# Patient Record
Sex: FEMALE | Race: WHITE | Employment: OTHER | URBAN - METROPOLITAN AREA
[De-identification: names, ages, dates, MRNs, and addresses within clinical notes are randomized per-mention and may not be internally consistent; named-entity substitution may affect disease eponyms.]

---

## 2019-04-16 PROBLEM — H35.30 MACULAR DEGENERATION, UNSPECIFIED LATERALITY, UNSPECIFIED TYPE: Status: ACTIVE | Noted: 2019-04-16

## 2019-04-16 PROBLEM — F02.80 ALZHEIMER'S DEMENTIA WITHOUT BEHAVIORAL DISTURBANCE, UNSPECIFIED TIMING OF DEMENTIA ONSET (HCC): Status: ACTIVE | Noted: 2019-04-16

## 2019-04-16 PROBLEM — M81.0 AGE-RELATED OSTEOPOROSIS WITHOUT CURRENT PATHOLOGICAL FRACTURE: Status: ACTIVE | Noted: 2019-04-16

## 2019-04-16 PROBLEM — I70.0 AORTIC ATHEROSCLEROSIS (HCC): Status: ACTIVE | Noted: 2019-04-16

## 2019-04-16 PROBLEM — N18.30 CKD (CHRONIC KIDNEY DISEASE) STAGE 3, GFR 30-59 ML/MIN (HCC): Status: ACTIVE | Noted: 2019-04-16

## 2019-04-16 PROBLEM — G30.9 ALZHEIMER'S DEMENTIA WITHOUT BEHAVIORAL DISTURBANCE, UNSPECIFIED TIMING OF DEMENTIA ONSET (HCC): Status: ACTIVE | Noted: 2019-04-16

## 2019-04-16 PROBLEM — E78.5 HYPERLIPIDEMIA, UNSPECIFIED HYPERLIPIDEMIA TYPE: Status: ACTIVE | Noted: 2019-04-16

## 2019-10-30 ENCOUNTER — HOSPITAL ENCOUNTER (OUTPATIENT)
Dept: BONE DENSITY | Facility: HOSPITAL | Age: 84
Discharge: HOME | End: 2019-10-30
Attending: INTERNAL MEDICINE
Payer: MEDICARE

## 2019-10-30 DIAGNOSIS — M81.0 OSTEOPOROSIS, UNSPECIFIED OSTEOPOROSIS TYPE, UNSPECIFIED PATHOLOGICAL FRACTURE PRESENCE: ICD-10-CM

## 2019-10-30 PROCEDURE — 77080 DXA BONE DENSITY AXIAL: CPT | Performed by: INTERNAL MEDICINE

## 2020-05-16 ENCOUNTER — HOSPITAL ENCOUNTER (OUTPATIENT)
Dept: ULTRASOUND IMAGING | Facility: HOSPITAL | Age: 85
Discharge: HOME | End: 2020-05-16
Attending: FAMILY MEDICINE
Payer: MEDICARE

## 2020-05-16 DIAGNOSIS — M79.89 LEG SWELLING: ICD-10-CM

## 2020-05-16 PROCEDURE — 93971 EXTREMITY STUDY: CPT | Performed by: FAMILY MEDICINE

## 2020-08-18 PROBLEM — M81.0 SENILE OSTEOPOROSIS: Status: ACTIVE | Noted: 2019-04-16

## 2020-08-18 PROBLEM — F32.A DEPRESSION, UNSPECIFIED DEPRESSION TYPE: Status: ACTIVE | Noted: 2020-08-18

## 2021-01-01 ENCOUNTER — HOSPITAL ENCOUNTER (OUTPATIENT)
Facility: HOSPITAL | Age: 86
Setting detail: OBSERVATION
Discharge: HOME HEALTH | End: 2021-01-01
Attending: EMERGENCY MEDICINE | Admitting: HOSPITALIST
Payer: MEDICARE

## 2021-01-01 ENCOUNTER — HOSPITAL ENCOUNTER (EMERGENCY)
Facility: HOSPITAL | Age: 86
Discharge: HOME | End: 2021-01-01
Attending: EMERGENCY MEDICINE
Payer: MEDICARE

## 2021-01-01 ENCOUNTER — APPOINTMENT (OUTPATIENT)
Dept: GENERAL RADIOLOGY | Facility: HOSPITAL | Age: 86
End: 2021-01-01
Attending: EMERGENCY MEDICINE
Payer: MEDICARE

## 2021-01-01 ENCOUNTER — APPOINTMENT (OUTPATIENT)
Dept: CT IMAGING | Facility: HOSPITAL | Age: 86
DRG: 087 | End: 2021-01-01
Attending: EMERGENCY MEDICINE
Payer: MEDICARE

## 2021-01-01 ENCOUNTER — APPOINTMENT (OUTPATIENT)
Dept: GENERAL RADIOLOGY | Facility: HOSPITAL | Age: 86
End: 2021-01-01
Attending: INTERNAL MEDICINE
Payer: MEDICARE

## 2021-01-01 ENCOUNTER — HOSPITAL ENCOUNTER (INPATIENT)
Facility: HOSPITAL | Age: 86
LOS: 4 days | Discharge: HOME | DRG: 087 | End: 2021-01-01
Attending: EMERGENCY MEDICINE | Admitting: INTERNAL MEDICINE
Payer: MEDICARE

## 2021-01-01 ENCOUNTER — APPOINTMENT (OUTPATIENT)
Dept: CT IMAGING | Facility: HOSPITAL | Age: 86
DRG: 087 | End: 2021-01-01
Attending: INTERNAL MEDICINE
Payer: MEDICARE

## 2021-01-01 VITALS
HEIGHT: 62.01 IN | DIASTOLIC BLOOD PRESSURE: 74 MMHG | BODY MASS INDEX: 17.47 KG/M2 | RESPIRATION RATE: 16 BRPM | SYSTOLIC BLOOD PRESSURE: 116 MMHG | OXYGEN SATURATION: 94 % | TEMPERATURE: 98 F | WEIGHT: 96.13 LBS | HEART RATE: 82 BPM

## 2021-01-01 VITALS
HEIGHT: 62 IN | OXYGEN SATURATION: 94 % | RESPIRATION RATE: 18 BRPM | TEMPERATURE: 97 F | BODY MASS INDEX: 18.03 KG/M2 | HEART RATE: 76 BPM | SYSTOLIC BLOOD PRESSURE: 157 MMHG | DIASTOLIC BLOOD PRESSURE: 89 MMHG | WEIGHT: 98 LBS

## 2021-01-01 VITALS
BODY MASS INDEX: 17.66 KG/M2 | SYSTOLIC BLOOD PRESSURE: 120 MMHG | RESPIRATION RATE: 18 BRPM | TEMPERATURE: 98 F | DIASTOLIC BLOOD PRESSURE: 66 MMHG | WEIGHT: 96 LBS | HEART RATE: 102 BPM | HEIGHT: 62 IN | OXYGEN SATURATION: 92 %

## 2021-01-01 DIAGNOSIS — S06.341A TRAUMATIC HEMORRHAGE OF RIGHT CEREBRUM WITH LOSS OF CONSCIOUSNESS OF 30 MINUTES OR LESS, INITIAL ENCOUNTER (HCC): Primary | ICD-10-CM

## 2021-01-01 DIAGNOSIS — S00.81XA ABRASION OF FACE, INITIAL ENCOUNTER: ICD-10-CM

## 2021-01-01 DIAGNOSIS — R53.1 WEAKNESS GENERALIZED: Primary | ICD-10-CM

## 2021-01-01 DIAGNOSIS — I62.03 CHRONIC SUBDURAL HEMATOMA (HCC): ICD-10-CM
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
## 2021-01-01 LAB
ALBUMIN SERPL-MCNC: 2.3 G/DL (ref 3.4–5)
ALBUMIN/GLOB SERPL: 0.6 {RATIO} (ref 1–2)
ALP LIVER SERPL-CCNC: 66 U/L
ALT SERPL-CCNC: 16 U/L
ANION GAP SERPL CALC-SCNC: 4 MMOL/L (ref 0–18)
ANION GAP SERPL CALC-SCNC: 5 MMOL/L (ref 0–18)
ANION GAP SERPL CALC-SCNC: 7 MMOL/L (ref 0–18)
ANION GAP SERPL CALC-SCNC: 8 MMOL/L (ref 0–18)
ANION GAP SERPL CALC-SCNC: 9 MMOL/L (ref 0–18)
ANTIBODY SCREEN: NEGATIVE
APTT PPP: 25.4 SECONDS (ref 23.2–35.3)
AST SERPL-CCNC: 23 U/L (ref 15–37)
BASOPHILS # BLD AUTO: 0.07 X10(3) UL (ref 0–0.2)
BASOPHILS # BLD AUTO: 0.08 X10(3) UL (ref 0–0.2)
BASOPHILS # BLD AUTO: 0.09 X10(3) UL (ref 0–0.2)
BASOPHILS # BLD AUTO: 0.1 X10(3) UL (ref 0–0.2)
BASOPHILS # BLD AUTO: 0.14 X10(3) UL (ref 0–0.2)
BASOPHILS NFR BLD AUTO: 0.5 %
BASOPHILS NFR BLD AUTO: 0.5 %
BASOPHILS NFR BLD AUTO: 0.9 %
BASOPHILS NFR BLD AUTO: 1.1 %
BASOPHILS NFR BLD AUTO: 1.1 %
BILIRUB SERPL-MCNC: 0.5 MG/DL (ref 0.1–2)
BILIRUB UR QL: NEGATIVE
BUN BLD-MCNC: 10 MG/DL (ref 7–18)
BUN BLD-MCNC: 11 MG/DL (ref 7–18)
BUN BLD-MCNC: 21 MG/DL (ref 7–18)
BUN/CREAT SERPL: 13.5 (ref 10–20)
BUN/CREAT SERPL: 14.1 (ref 10–20)
BUN/CREAT SERPL: 15.1 (ref 10–20)
BUN/CREAT SERPL: 15.9 (ref 10–20)
BUN/CREAT SERPL: 22.1 (ref 10–20)
CALCIUM BLD-MCNC: 10.2 MG/DL (ref 8.5–10.1)
CALCIUM BLD-MCNC: 8.5 MG/DL (ref 8.5–10.1)
CALCIUM BLD-MCNC: 8.8 MG/DL (ref 8.5–10.1)
CALCIUM BLD-MCNC: 9.1 MG/DL (ref 8.5–10.1)
CALCIUM BLD-MCNC: 9.4 MG/DL (ref 8.5–10.1)
CHLORIDE SERPL-SCNC: 107 MMOL/L (ref 98–112)
CHLORIDE SERPL-SCNC: 107 MMOL/L (ref 98–112)
CHLORIDE SERPL-SCNC: 111 MMOL/L (ref 98–112)
CHLORIDE SERPL-SCNC: 113 MMOL/L (ref 98–112)
CHLORIDE SERPL-SCNC: 115 MMOL/L (ref 98–112)
CHOLEST SMN-MCNC: 200 MG/DL (ref ?–200)
CO2 SERPL-SCNC: 22 MMOL/L (ref 21–32)
CO2 SERPL-SCNC: 26 MMOL/L (ref 21–32)
CO2 SERPL-SCNC: 26 MMOL/L (ref 21–32)
CO2 SERPL-SCNC: 27 MMOL/L (ref 21–32)
CO2 SERPL-SCNC: 27 MMOL/L (ref 21–32)
COLOR UR: YELLOW
CREAT BLD-MCNC: 0.69 MG/DL
CREAT BLD-MCNC: 0.73 MG/DL
CREAT BLD-MCNC: 0.74 MG/DL
CREAT BLD-MCNC: 0.78 MG/DL
CREAT BLD-MCNC: 0.95 MG/DL
DEPRECATED RDW RBC AUTO: 47.5 FL (ref 35.1–46.3)
DEPRECATED RDW RBC AUTO: 47.5 FL (ref 35.1–46.3)
DEPRECATED RDW RBC AUTO: 48.6 FL (ref 35.1–46.3)
DEPRECATED RDW RBC AUTO: 48.9 FL (ref 35.1–46.3)
DEPRECATED RDW RBC AUTO: 49.6 FL (ref 35.1–46.3)
EOSINOPHIL # BLD AUTO: 0.03 X10(3) UL (ref 0–0.7)
EOSINOPHIL # BLD AUTO: 0.07 X10(3) UL (ref 0–0.7)
EOSINOPHIL # BLD AUTO: 0.19 X10(3) UL (ref 0–0.7)
EOSINOPHIL # BLD AUTO: 0.27 X10(3) UL (ref 0–0.7)
EOSINOPHIL # BLD AUTO: 0.57 X10(3) UL (ref 0–0.7)
EOSINOPHIL NFR BLD AUTO: 0.2 %
EOSINOPHIL NFR BLD AUTO: 0.5 %
EOSINOPHIL NFR BLD AUTO: 1.5 %
EOSINOPHIL NFR BLD AUTO: 2.8 %
EOSINOPHIL NFR BLD AUTO: 6.4 %
ERYTHROCYTE [DISTWIDTH] IN BLOOD BY AUTOMATED COUNT: 14.6 % (ref 11–15)
ERYTHROCYTE [DISTWIDTH] IN BLOOD BY AUTOMATED COUNT: 14.7 % (ref 11–15)
ERYTHROCYTE [DISTWIDTH] IN BLOOD BY AUTOMATED COUNT: 14.7 % (ref 11–15)
ERYTHROCYTE [DISTWIDTH] IN BLOOD BY AUTOMATED COUNT: 15 % (ref 11–15)
ERYTHROCYTE [DISTWIDTH] IN BLOOD BY AUTOMATED COUNT: 15.3 % (ref 11–15)
GLOBULIN PLAS-MCNC: 3.9 G/DL (ref 2.8–4.4)
GLUCOSE BLD-MCNC: 112 MG/DL (ref 70–99)
GLUCOSE BLD-MCNC: 115 MG/DL (ref 70–99)
GLUCOSE BLD-MCNC: 139 MG/DL (ref 70–99)
GLUCOSE BLD-MCNC: 95 MG/DL (ref 70–99)
GLUCOSE BLD-MCNC: 95 MG/DL (ref 70–99)
GLUCOSE BLDC GLUCOMTR-MCNC: 100 MG/DL (ref 70–99)
GLUCOSE BLDC GLUCOMTR-MCNC: 101 MG/DL (ref 70–99)
GLUCOSE BLDC GLUCOMTR-MCNC: 101 MG/DL (ref 70–99)
GLUCOSE BLDC GLUCOMTR-MCNC: 106 MG/DL (ref 70–99)
GLUCOSE BLDC GLUCOMTR-MCNC: 109 MG/DL (ref 70–99)
GLUCOSE BLDC GLUCOMTR-MCNC: 113 MG/DL (ref 70–99)
GLUCOSE BLDC GLUCOMTR-MCNC: 116 MG/DL (ref 70–99)
GLUCOSE BLDC GLUCOMTR-MCNC: 141 MG/DL (ref 70–99)
GLUCOSE BLDC GLUCOMTR-MCNC: 82 MG/DL (ref 70–99)
GLUCOSE BLDC GLUCOMTR-MCNC: 86 MG/DL (ref 70–99)
GLUCOSE BLDC GLUCOMTR-MCNC: 89 MG/DL (ref 70–99)
GLUCOSE BLDC GLUCOMTR-MCNC: 91 MG/DL (ref 70–99)
GLUCOSE BLDC GLUCOMTR-MCNC: 94 MG/DL (ref 70–99)
GLUCOSE BLDC GLUCOMTR-MCNC: 96 MG/DL (ref 70–99)
GLUCOSE BLDC GLUCOMTR-MCNC: 96 MG/DL (ref 70–99)
GLUCOSE BLDC GLUCOMTR-MCNC: 97 MG/DL (ref 70–99)
GLUCOSE UR-MCNC: NEGATIVE MG/DL
HCT VFR BLD AUTO: 38 %
HCT VFR BLD AUTO: 38.5 %
HCT VFR BLD AUTO: 42.5 %
HCT VFR BLD AUTO: 42.6 %
HCT VFR BLD AUTO: 42.8 %
HDLC SERPL-MCNC: 64 MG/DL (ref 40–59)
HGB BLD-MCNC: 12.3 G/DL
HGB BLD-MCNC: 12.8 G/DL
HGB BLD-MCNC: 13.6 G/DL
HGB BLD-MCNC: 13.7 G/DL
HGB BLD-MCNC: 13.9 G/DL
HGB UR QL STRIP.AUTO: NEGATIVE
IMM GRANULOCYTES # BLD AUTO: 0.03 X10(3) UL (ref 0–1)
IMM GRANULOCYTES # BLD AUTO: 0.03 X10(3) UL (ref 0–1)
IMM GRANULOCYTES # BLD AUTO: 0.05 X10(3) UL (ref 0–1)
IMM GRANULOCYTES # BLD AUTO: 0.05 X10(3) UL (ref 0–1)
IMM GRANULOCYTES # BLD AUTO: 0.07 X10(3) UL (ref 0–1)
IMM GRANULOCYTES NFR BLD: 0.3 %
IMM GRANULOCYTES NFR BLD: 0.4 %
IMM GRANULOCYTES NFR BLD: 0.6 %
INR BLD: 1.05 (ref 0.9–1.2)
KETONES UR-MCNC: NEGATIVE MG/DL
LDLC SERPL CALC-MCNC: 122 MG/DL (ref ?–100)
LEUKOCYTE ESTERASE UR QL STRIP.AUTO: NEGATIVE
LYMPHOCYTES # BLD AUTO: 1.36 X10(3) UL (ref 1–4)
LYMPHOCYTES # BLD AUTO: 1.53 X10(3) UL (ref 1–4)
LYMPHOCYTES # BLD AUTO: 1.72 X10(3) UL (ref 1–4)
LYMPHOCYTES # BLD AUTO: 1.88 X10(3) UL (ref 1–4)
LYMPHOCYTES # BLD AUTO: 2.31 X10(3) UL (ref 1–4)
LYMPHOCYTES NFR BLD AUTO: 10 %
LYMPHOCYTES NFR BLD AUTO: 13.5 %
LYMPHOCYTES NFR BLD AUTO: 14.2 %
LYMPHOCYTES NFR BLD AUTO: 18.7 %
LYMPHOCYTES NFR BLD AUTO: 21.2 %
M PROTEIN MFR SERPL ELPH: 6.2 G/DL (ref 6.4–8.2)
MCH RBC QN AUTO: 28.2 PG (ref 26–34)
MCH RBC QN AUTO: 28.5 PG (ref 26–34)
MCH RBC QN AUTO: 28.5 PG (ref 26–34)
MCH RBC QN AUTO: 29 PG (ref 26–34)
MCH RBC QN AUTO: 29.4 PG (ref 26–34)
MCHC RBC AUTO-ENTMCNC: 31.9 G/DL (ref 31–37)
MCHC RBC AUTO-ENTMCNC: 31.9 G/DL (ref 31–37)
MCHC RBC AUTO-ENTMCNC: 32.2 G/DL (ref 31–37)
MCHC RBC AUTO-ENTMCNC: 32.5 G/DL (ref 31–37)
MCHC RBC AUTO-ENTMCNC: 33.7 G/DL (ref 31–37)
MCV RBC AUTO: 87.4 FL
MCV RBC AUTO: 87.4 FL
MCV RBC AUTO: 89.1 FL
MCV RBC AUTO: 89.1 FL
MCV RBC AUTO: 89.4 FL
MONOCYTES # BLD AUTO: 0.91 X10(3) UL (ref 0.1–1)
MONOCYTES # BLD AUTO: 0.96 X10(3) UL (ref 0.1–1)
MONOCYTES # BLD AUTO: 1.25 X10(3) UL (ref 0.1–1)
MONOCYTES # BLD AUTO: 1.59 X10(3) UL (ref 0.1–1)
MONOCYTES # BLD AUTO: 1.6 X10(3) UL (ref 0.1–1)
MONOCYTES NFR BLD AUTO: 10 %
MONOCYTES NFR BLD AUTO: 10.1 %
MONOCYTES NFR BLD AUTO: 10.3 %
MONOCYTES NFR BLD AUTO: 10.3 %
MONOCYTES NFR BLD AUTO: 12.5 %
NEUTROPHILS # BLD AUTO: 12.09 X10 (3) UL (ref 1.5–7.7)
NEUTROPHILS # BLD AUTO: 12.09 X10(3) UL (ref 1.5–7.7)
NEUTROPHILS # BLD AUTO: 5.37 X10 (3) UL (ref 1.5–7.7)
NEUTROPHILS # BLD AUTO: 5.37 X10(3) UL (ref 1.5–7.7)
NEUTROPHILS # BLD AUTO: 6.86 X10 (3) UL (ref 1.5–7.7)
NEUTROPHILS # BLD AUTO: 6.86 X10(3) UL (ref 1.5–7.7)
NEUTROPHILS # BLD AUTO: 8.39 X10 (3) UL (ref 1.5–7.7)
NEUTROPHILS # BLD AUTO: 8.39 X10(3) UL (ref 1.5–7.7)
NEUTROPHILS # BLD AUTO: 9.24 X10 (3) UL (ref 1.5–7.7)
NEUTROPHILS # BLD AUTO: 9.24 X10(3) UL (ref 1.5–7.7)
NEUTROPHILS NFR BLD AUTO: 60.7 %
NEUTROPHILS NFR BLD AUTO: 68 %
NEUTROPHILS NFR BLD AUTO: 71.8 %
NEUTROPHILS NFR BLD AUTO: 72.6 %
NEUTROPHILS NFR BLD AUTO: 78.7 %
NITRITE UR QL STRIP.AUTO: NEGATIVE
NONHDLC SERPL-MCNC: 136 MG/DL (ref ?–130)
OSMOLALITY SERPL CALC.SUM OF ELEC: 292 MOSM/KG (ref 275–295)
OSMOLALITY SERPL CALC.SUM OF ELEC: 294 MOSM/KG (ref 275–295)
OSMOLALITY SERPL CALC.SUM OF ELEC: 297 MOSM/KG (ref 275–295)
OSMOLALITY SERPL CALC.SUM OF ELEC: 299 MOSM/KG (ref 275–295)
OSMOLALITY SERPL CALC.SUM OF ELEC: 300 MOSM/KG (ref 275–295)
PH UR: 6 [PH] (ref 5–8)
PLATELET # BLD AUTO: 217 10(3)UL (ref 150–450)
PLATELET # BLD AUTO: 220 10(3)UL (ref 150–450)
PLATELET # BLD AUTO: 260 10(3)UL (ref 150–450)
PLATELET # BLD AUTO: 261 10(3)UL (ref 150–450)
PLATELET # BLD AUTO: 261 10(3)UL (ref 150–450)
POTASSIUM SERPL-SCNC: 3.1 MMOL/L (ref 3.5–5.1)
POTASSIUM SERPL-SCNC: 3.4 MMOL/L (ref 3.5–5.1)
POTASSIUM SERPL-SCNC: 3.5 MMOL/L (ref 3.5–5.1)
POTASSIUM SERPL-SCNC: 3.6 MMOL/L (ref 3.5–5.1)
POTASSIUM SERPL-SCNC: 3.8 MMOL/L (ref 3.5–5.1)
PROT UR-MCNC: NEGATIVE MG/DL
PROTHROMBIN TIME: 13.5 SECONDS (ref 11.8–14.5)
RBC # BLD AUTO: 4.32 X10(6)UL
RBC # BLD AUTO: 4.35 X10(6)UL
RBC # BLD AUTO: 4.78 X10(6)UL
RBC # BLD AUTO: 4.79 X10(6)UL
RBC # BLD AUTO: 4.86 X10(6)UL
RH BLOOD TYPE: POSITIVE
SARS-COV-2 RNA RESP QL NAA+PROBE: NOT DETECTED
SODIUM SERPL-SCNC: 140 MMOL/L (ref 136–145)
SODIUM SERPL-SCNC: 142 MMOL/L (ref 136–145)
SODIUM SERPL-SCNC: 142 MMOL/L (ref 136–145)
SODIUM SERPL-SCNC: 145 MMOL/L (ref 136–145)
SODIUM SERPL-SCNC: 145 MMOL/L (ref 136–145)
SP GR UR STRIP: 1.02 (ref 1–1.03)
TRIGL SERPL-MCNC: 79 MG/DL (ref 30–149)
TROPONIN I SERPL-MCNC: <0.045 NG/ML (ref ?–0.04)
UROBILINOGEN UR STRIP-ACNC: 2
VLDLC SERPL CALC-MCNC: 14 MG/DL (ref 0–30)
WBC # BLD AUTO: 12.4 X10(3) UL (ref 4–11)
WBC # BLD AUTO: 12.8 X10(3) UL (ref 4–11)
WBC # BLD AUTO: 15.4 X10(3) UL (ref 4–11)
WBC # BLD AUTO: 8.9 X10(3) UL (ref 4–11)
WBC # BLD AUTO: 9.6 X10(3) UL (ref 4–11)

## 2021-01-01 PROCEDURE — 97166 OT EVAL MOD COMPLEX 45 MIN: CPT

## 2021-01-01 PROCEDURE — 85025 COMPLETE CBC W/AUTO DIFF WBC: CPT | Performed by: EMERGENCY MEDICINE

## 2021-01-01 PROCEDURE — 93005 ELECTROCARDIOGRAM TRACING: CPT

## 2021-01-01 PROCEDURE — 90792 PSYCH DIAG EVAL W/MED SRVCS: CPT | Performed by: OTHER

## 2021-01-01 PROCEDURE — 86850 RBC ANTIBODY SCREEN: CPT | Performed by: EMERGENCY MEDICINE

## 2021-01-01 PROCEDURE — 85610 PROTHROMBIN TIME: CPT | Performed by: EMERGENCY MEDICINE

## 2021-01-01 PROCEDURE — 70450 CT HEAD/BRAIN W/O DYE: CPT | Performed by: INTERNAL MEDICINE

## 2021-01-01 PROCEDURE — 97530 THERAPEUTIC ACTIVITIES: CPT

## 2021-01-01 PROCEDURE — 36415 COLL VENOUS BLD VENIPUNCTURE: CPT

## 2021-01-01 PROCEDURE — 97116 GAIT TRAINING THERAPY: CPT

## 2021-01-01 PROCEDURE — 71045 X-RAY EXAM CHEST 1 VIEW: CPT | Performed by: EMERGENCY MEDICINE

## 2021-01-01 PROCEDURE — 82962 GLUCOSE BLOOD TEST: CPT

## 2021-01-01 PROCEDURE — 92526 ORAL FUNCTION THERAPY: CPT

## 2021-01-01 PROCEDURE — 85025 COMPLETE CBC W/AUTO DIFF WBC: CPT

## 2021-01-01 PROCEDURE — 80048 BASIC METABOLIC PNL TOTAL CA: CPT | Performed by: EMERGENCY MEDICINE

## 2021-01-01 PROCEDURE — 86901 BLOOD TYPING SEROLOGIC RH(D): CPT | Performed by: EMERGENCY MEDICINE

## 2021-01-01 PROCEDURE — 92610 EVALUATE SWALLOWING FUNCTION: CPT

## 2021-01-01 PROCEDURE — 99225 SUBSEQUENT OBSERVATION CARE: CPT | Performed by: OTHER

## 2021-01-01 PROCEDURE — 72125 CT NECK SPINE W/O DYE: CPT | Performed by: EMERGENCY MEDICINE

## 2021-01-01 PROCEDURE — 97162 PT EVAL MOD COMPLEX 30 MIN: CPT

## 2021-01-01 PROCEDURE — 85730 THROMBOPLASTIN TIME PARTIAL: CPT | Performed by: EMERGENCY MEDICINE

## 2021-01-01 PROCEDURE — 99285 EMERGENCY DEPT VISIT HI MDM: CPT

## 2021-01-01 PROCEDURE — 80048 BASIC METABOLIC PNL TOTAL CA: CPT

## 2021-01-01 PROCEDURE — 93010 ELECTROCARDIOGRAM REPORT: CPT | Performed by: EMERGENCY MEDICINE

## 2021-01-01 PROCEDURE — 70450 CT HEAD/BRAIN W/O DYE: CPT | Performed by: EMERGENCY MEDICINE

## 2021-01-01 PROCEDURE — 99284 EMERGENCY DEPT VISIT MOD MDM: CPT

## 2021-01-01 PROCEDURE — 86900 BLOOD TYPING SEROLOGIC ABO: CPT | Performed by: EMERGENCY MEDICINE

## 2021-01-01 PROCEDURE — 81001 URINALYSIS AUTO W/SCOPE: CPT | Performed by: EMERGENCY MEDICINE

## 2021-01-01 PROCEDURE — 71045 X-RAY EXAM CHEST 1 VIEW: CPT | Performed by: INTERNAL MEDICINE

## 2021-01-01 PROCEDURE — 84484 ASSAY OF TROPONIN QUANT: CPT | Performed by: EMERGENCY MEDICINE

## 2021-01-01 RX ORDER — ACETAMINOPHEN 325 MG/1
650 TABLET ORAL EVERY 6 HOURS PRN
Status: DISCONTINUED | OUTPATIENT
Start: 2021-01-01 | End: 2021-01-01

## 2021-01-01 RX ORDER — MIRTAZAPINE 15 MG/1
15 TABLET, FILM COATED ORAL NIGHTLY
Status: DISCONTINUED | OUTPATIENT
Start: 2021-01-01 | End: 2021-01-01

## 2021-01-01 RX ORDER — HEPARIN SODIUM 5000 [USP'U]/ML
5000 INJECTION, SOLUTION INTRAVENOUS; SUBCUTANEOUS EVERY 8 HOURS SCHEDULED
Status: DISCONTINUED | OUTPATIENT
Start: 2021-01-01 | End: 2021-01-01

## 2021-01-01 RX ORDER — SODIUM CHLORIDE 9 MG/ML
INJECTION, SOLUTION INTRAVENOUS CONTINUOUS
Status: DISCONTINUED | OUTPATIENT
Start: 2021-01-01 | End: 2021-01-01

## 2021-01-01 RX ORDER — MIRTAZAPINE 15 MG/1
15 TABLET, FILM COATED ORAL NIGHTLY
Qty: 30 TABLET | Refills: 0 | Status: SHIPPED | OUTPATIENT
Start: 2021-01-01 | End: 2021-01-01 | Stop reason: CLARIF

## 2021-01-01 RX ORDER — LISINOPRIL 10 MG/1
10 TABLET ORAL DAILY
Qty: 90 TABLET | Refills: 1 | Status: SHIPPED | OUTPATIENT
Start: 2021-01-01 | End: 2021-01-01 | Stop reason: CLARIF

## 2021-01-01 RX ORDER — LORAZEPAM 2 MG/ML
1 INJECTION INTRAMUSCULAR
Status: DISCONTINUED | OUTPATIENT
Start: 2021-01-01 | End: 2021-01-01

## 2021-01-01 RX ORDER — LABETALOL HYDROCHLORIDE 5 MG/ML
10 INJECTION, SOLUTION INTRAVENOUS EVERY 10 MIN PRN
Status: DISCONTINUED | OUTPATIENT
Start: 2021-01-01 | End: 2021-01-01

## 2021-01-01 RX ORDER — MIRTAZAPINE 7.5 MG/1
7 TABLET, FILM COATED ORAL NIGHTLY
Status: DISCONTINUED | OUTPATIENT
Start: 2021-01-01 | End: 2021-01-01

## 2021-01-01 RX ORDER — ATORVASTATIN CALCIUM 20 MG/1
20 TABLET, FILM COATED ORAL NIGHTLY
Status: DISCONTINUED | OUTPATIENT
Start: 2021-01-01 | End: 2021-01-01

## 2021-01-01 RX ORDER — METOCLOPRAMIDE HYDROCHLORIDE 5 MG/ML
5 INJECTION INTRAMUSCULAR; INTRAVENOUS EVERY 8 HOURS PRN
Status: DISCONTINUED | OUTPATIENT
Start: 2021-01-01 | End: 2021-01-01

## 2021-01-01 RX ORDER — MIRTAZAPINE 15 MG/1
15 TABLET, FILM COATED ORAL NIGHTLY
Qty: 90 TABLET | Refills: 0 | Status: SHIPPED | OUTPATIENT
Start: 2021-01-01 | End: 2021-01-01 | Stop reason: CLARIF

## 2021-01-01 RX ORDER — ATORVASTATIN CALCIUM 20 MG/1
20 TABLET, FILM COATED ORAL NIGHTLY
Qty: 90 TABLET | Refills: 0 | Status: SHIPPED | OUTPATIENT
Start: 2021-01-01 | End: 2021-01-01 | Stop reason: CLARIF

## 2021-01-01 RX ORDER — ACETAMINOPHEN 325 MG/1
650 TABLET ORAL EVERY 4 HOURS PRN
Status: DISCONTINUED | OUTPATIENT
Start: 2021-01-01 | End: 2021-01-01

## 2021-01-01 RX ORDER — ONDANSETRON 2 MG/ML
4 INJECTION INTRAMUSCULAR; INTRAVENOUS EVERY 6 HOURS PRN
Status: DISCONTINUED | OUTPATIENT
Start: 2021-01-01 | End: 2021-01-01

## 2021-01-01 RX ORDER — HYDRALAZINE HYDROCHLORIDE 20 MG/ML
10 INJECTION INTRAMUSCULAR; INTRAVENOUS EVERY 2 HOUR PRN
Status: DISCONTINUED | OUTPATIENT
Start: 2021-01-01 | End: 2021-01-01

## 2021-01-01 RX ORDER — ACETAMINOPHEN 650 MG/1
650 SUPPOSITORY RECTAL EVERY 4 HOURS PRN
Status: DISCONTINUED | OUTPATIENT
Start: 2021-01-01 | End: 2021-01-01

## 2021-01-01 RX ORDER — POTASSIUM CHLORIDE 20 MEQ/1
40 TABLET, EXTENDED RELEASE ORAL ONCE
Status: COMPLETED | OUTPATIENT
Start: 2021-01-01 | End: 2021-01-01

## 2021-06-15 NOTE — ED PROVIDER NOTES
Patient Seen in: [facility] AND [facility] Emergency Department    History   Patient presents with:  Weakness      HPI    45-year-old female presents the ER with complaint of generalized weakness and fatigue. Patient was diagnosed with pneumonia few weeks ago.   A patient were taken. The patient was already wearing a droplet mask on my arrival to the room.  Personal protective equipment including droplet mask, eye protection, and gloves were worn throughout the duration of the exam.  Handwashing was performed prior t (8) - Normal   RAPID SARS-COV-2 BY PCR - Normal   CBC WITH DIFFERENTIAL WITH PLATELET    Narrative: The following orders were created for panel order CBC With Differential With Platelet.   Procedure                               Abnormality         Stat patient already has does not have any pertinent problems on file. to contribute to the complexity of this ED evaluation. ED Course: 28-year-old female presents the ER with complaint of generalized weakness.   Patient's blood work, chest x-ray, urine all

## 2021-06-15 NOTE — CM/SW NOTE
The pt lives at [facility] senior living with her . That facility uses [facility] for on site PT/OT. Contacted [facility] and the order was faxed to [phone]. The pt and family updated as well concerning home PT/OT.

## 2021-06-15 NOTE — ED INITIAL ASSESSMENT (HPI)
Panfilo Jernigan arrives with complaints of \"feeling faint and lightheaded. \"   Denies pain.  She states this started early this morning    Patient was seen at [facility] yesterday and diagnosed with pneumonia

## 2021-06-24 NOTE — CM/SW NOTE
Message left with [facility] concerning orders for outpt PT/OT and to return call.

## 2021-06-29 PROBLEM — R53.1 WEAKNESS GENERALIZED: Status: ACTIVE | Noted: 2021-01-01

## 2021-06-29 NOTE — ED INITIAL ASSESSMENT (HPI)
Pt brought in by Family for weakness, per pt's Daughter pt has not been eating for 2 weeks. Pt was seen here 2 weeks ago for the same symptoms and was discharged.

## 2021-06-30 PROBLEM — F32.1 MAJOR DEPRESSIVE DISORDER, SINGLE EPISODE, MODERATE (HCC): Status: ACTIVE | Noted: 2021-01-01

## 2021-06-30 PROBLEM — F01.51 MIXED VASCULAR AND NEURODEGENERATIVE DEMENTIA WITH BEHAVIORAL DISTURBANCE (HCC): Status: ACTIVE | Noted: 2019-04-16

## 2021-06-30 NOTE — PHYSICAL THERAPY NOTE
PHYSICAL THERAPY EVALUATION - INPATIENT     Room Number: 545/545-A  Evaluation Date: 6/30/2021  Type of Evaluation: Initial   Physician Order: PT Eval and Treat    Presenting Problem: failure to thrive  Reason for Therapy: Mobility Dysfunction and Dischar MEDICAL/SOCIAL HISTORY     History related to current admission:      Problem List  Principal Problem:    Weakness generalized      Past Medical History  History reviewed. No pertinent past medical history. Past Surgical History  History reviewed.  No pe by: 7/13  Patient Goal Patient's self-stated goal is: home   Goal #1 Patient is able to demonstrate supine - sit EOB @ level: independent     Goal #1   Current Status    Goal #2 Patient is able to demonstrate transfers Sit to/from Stand at assistance level

## 2021-06-30 NOTE — CM/SW NOTE
Patient is from [place] [address]. Patient and family report patient needs higher level of care. They had declined on previous ED visit this month. PT/OT eval ordered for SNF versus rehab placement.   Patient's daughter is at her bedside and r

## 2021-06-30 NOTE — H&P
[facility] Hospitalist H&P     CC: Patient presents with:  Failure To Thrive       PCP: Andrew Butterfield MD    Admitted 6/29/2021    Assessment and Plan   Lucho Orozco is a 80year old female with PMH sig for major depressive disorder, hypertension, h time.  -Per family she has been refusing all of her medications for the last few weeks.   Was on Escitalopram 20 mg daily most recently.  -We will resume at 10 mg if patient willing to take    Leukocytosis   -patient afebrile, no source of infection seen, U recent events and type of place. She does not seem agreeable to answering a lot of my more detailed questions as she thinks that I am trying to preach at her and tell her what to do.   She tells me that she is not stupid and that she has a good mind and th daily.  ), Disp: 90 tablet, Rfl: 3        Soc Hx  Social History    Tobacco Use      Smoking status: Never Smoker      Smokeless tobacco: Never Used    Alcohol use:  Yes      Alcohol/week: 1.0 standard drinks      Types: 1 Glasses of wine per week      Comm Marie Lovell MD on 6/30/2021 at 6:45 AM     Finalized by (CST): Maria De Jesus Sanchez MD on 6/30/2021 at 6:48 AM

## 2021-06-30 NOTE — CM/SW NOTE
LATASHA received MDO for discharge planning. LATASHA contacted patient's daughter, Elizabeth Espinoza, to discuss discharge planning. Azra reports that address on facesheet is Azra's and has it listed so that bills go to Elizabeth Alexis as she handles finances/mail for patient.  Azra hogan

## 2021-06-30 NOTE — CM/SW NOTE
MDO for YVETTE. PT eval pending. Ref sent, DON called, covid neg    Will present list for choice and start ins auth when list available. / to remain available for support and/or discharge planning.      Carlos Villanueva RN  Ca

## 2021-06-30 NOTE — OCCUPATIONAL THERAPY NOTE
OCCUPATIONAL THERAPY EVALUATION - INPATIENT     Room Number: 545/545-A  Evaluation Date: 6/30/2021  Type of Evaluation: Initial       Physician Order: IP Consult to Occupational Therapy  Reason for Therapy: ADL/IADL Dysfunction and Discharge Planning    OC calculated based on documentation in the [facility] '6 clicks' Inpatient Daily Activity Short Form. Research supports that patients with this level of impairment may benefit from [place] - she may benefit from memory care or 24/7 supervision.  She was left in chair awareness of errors   Awareness of Deficits:  decreased awareness of deficits  Problem Solving:  assistance required to identify errors made, assistance required to generate solutions and assistance required to implement solutions    RANGE OF MOTION   Uppe Comment:     Patient will complete item retrieval with SBA   Comment:     Patient will complete all dressing with SBA   Comment:              Goals  on:   Frequency: 3-5x a week    Mita Olsen MOT/R  200  [address] [facility]

## 2021-06-30 NOTE — HOME CARE LIAISON
Received [facility] referral from [place] [id number]. Per request spoke with patient's daughter Marsha Starks.   Patient's daughter  Emir Echeverria states she is undecided whether to use [facility] at her [facility] assisted [facility] or to use services wi

## 2021-06-30 NOTE — ED QUICK NOTES
Orders for admission, patient is aware of plan and ready to go upstairs. Any questions, please call ED SYBIL Lujan  at extension [phone].    Type of COVID test sent: Rapid, pending  COVID Suspicion level: Low    LOC at time of transport:  Alert    Other pertin

## 2021-06-30 NOTE — PLAN OF CARE
Problem: Patient Centered Care  Goal: Patient preferences are identified and integrated in the patient's plan of care  Description: Interventions:  - What would you like us to know as we care for you?  I live at [place] [address] with my harmeet unsuccessful or patient reports new pain  - Anticipate increased pain with activity and pre-medicate as appropriate  Outcome: Progressing     Problem: RISK FOR INFECTION - ADULT  Goal: Absence of fever/infection during anticipated neutropenic period  Descr

## 2021-06-30 NOTE — PLAN OF CARE
Patient was ambulating in the matt with PT today. Appetite appears to be good. Per dtr she is doing much better since having received IV fluids in ED yesterday, and IV fluids continued today.     Problem: Patient Centered Care  Goal: Patient preferences are Monitor for opioid side effects  - Notify MD/LIP if interventions unsuccessful or patient reports new pain  - Anticipate increased pain with activity and pre-medicate as appropriate  Outcome: Progressing     Problem: RISK FOR INFECTION - ADULT  Goal: Absen support system  Outcome: Not Progressing

## 2021-06-30 NOTE — ED PROVIDER NOTES
Patient Seen in: [facility] AND [facility] Emergency Department      History   Patient presents with:  Failure To Thrive    Stated Complaint: Failure to thrive    HPI/Subjective:   HPI    49-year-old female with no significant past medical history brought in by as noted above.     Physical Exam     ED Triage Vitals [06/29/21 1833]   BP (!) 165/103   Pulse 113   Resp 20   Temp 99.3 °F (37.4 °C)   Temp src Oral   SpO2 94 %   O2 Device None (Room air)       Current:/82   Pulse 91   Temp 99.3 °F (37.4 °C) (Oral) Osmolality 292 275 - 295 mOsm/kg    GFR, Non- 69 >=60    GFR, -American 79 >=60   CBC W/ DIFFERENTIAL    Collection Time: 06/29/21  6:38 PM   Result Value Ref Range    WBC 15.4 (H) 4.0 - 11.0 x10(3) uL    RBC 4.86 3.80 - 5.30 x10(6)u ordered and personally reviewed the labs and imaging and found leukocytosis, no anemia, electrolytes reassuring, no bacteriuria  - fluids ordered  -  unable to place pt in inpt rehab - requesting obs admission  - discussed with Dr. Kenny Ortiz - in

## 2021-07-01 NOTE — BH PROGRESS NOTE
Psych Liaison provided previously discussed bereavement and Alzheimer's Dementia resources in Molly's discharge instructions.      Psych Liaison will provide paper copy to Molly's daughter, Lea Zhang, in addition to listing of outpatient providers directly f

## 2021-07-01 NOTE — PLAN OF CARE
Problem: Patient Centered Care  Goal: Patient preferences are identified and integrated in the patient's plan of care  Description: Interventions:  - What would you like us to know as we care for you?  Lives at [address] [place] [address] with Progressing     Problem: RISK FOR INFECTION - ADULT  Goal: Absence of fever/infection during anticipated neutropenic period  Description: INTERVENTIONS  - Monitor WBC  - Administer growth factors as ordered  - Implement neutropenic guidelines  Outcome: Pro

## 2021-07-01 NOTE — CONSULTS
[facility]    Report of Consultation    Daija Anaya Patient Status:  Observation    1933 MRN [id number]   Location [facility] 5SW/SE Attending Talya Persaud MD   Hosp Day # 0 PCP Jordan Templeton MD     Date usually take care of medication. Otherwise her daughter reporting that patient has been diminishing in her memory over the last 3 years and has becoming more depressed, isolated, confused and defensive.   Azra today reported that her primary physician  and motivation to care for herself, decreased appetite, decreased sleep and irritability with Azra and her father.   Abdoul Covert reports Carrie Bell has no hx of anxiety or panic attacks but feels it may be possible that Carrie Bell is anxious about how people see her Continuous  acetaminophen (TYLENOL) tab 650 mg, 650 mg, Oral, Q6H PRN  Heparin Sodium (Porcine) 5000 UNIT/ML injection 5,000 Units, 5,000 Units, Subcutaneous, Q8H MILENA      escitalopram 10 MG Oral Tab, Take 1 tablet (10 mg total) by mouth daily.  (Patient ta airspace consolidation.     Dictated by (CST): Heber Manriquez MD on 6/30/2021 at 6:45 AM     Finalized by (CST): Heber Manriquez MD on 6/30/2021 at 6:48 AM            Vital Signs:     Blood pressure 119/63, pulse 84, temperature 97.8 °F (36.6 °C), temperatur the current symptom and severity. At this point, I would recommend the following approach:     1. Focus on education and support. 2.  Focus on insight orientation providing option of treatment. 3.  Hold on Lexapro which patient has been taking good  4.

## 2021-07-01 NOTE — DIETARY NOTE
ADULT NUTRITION INITIAL ASSESSMENT    Pt is at moderate nutrition risk. Pt meets moderate malnutrition criteria.       CRITERIA FOR MALNUTRITION DIAGNOSIS:  Criteria for non-severe malnutrition diagnosis: chronic illness related to energy intake less than7 90 %    06/30/21 1848  20 %    07/01/21 1051  100 %           Food Allergies: No known food allergies  Cultural/Ethnic/Quaker Preferences: No    MEDICATIONS: reviewed; noted no electrolyte replacement protocol in place  • sodium chloride 83 mL/hr at 07/ related to decreased ability to consume sufficient energy as evidenced by energy intake less than 75% for greater than 1 month, body fat mild depletion and muscle mass mild depletion    ESTIMATED NUTRITION NEEDS: Dosing wt: 43.5 kg  Calories: 2013-0260 robi skin breakdown, monitor fluid status and maintain true wt within 5%    DIETITIAN FOLLOW UP: RD to follow and monitor nutrition status    [facility] [address], [address], [address] [facility], [address] [facility]

## 2021-07-01 NOTE — PROGRESS NOTES
[facility] Hospitalist Progress Note     PCP: Mike Flowers MD    CC: Follow up       Assessment/Plan:     Yannick Donis is a 80year old female with PMH sig for major depressive disorder, hypertension, hyperlipidemia and recent loss of 2 sons to [facility] can hold lisinopril, patient not taking at home     Hyperlipidemia  -Patient not taking at home, , restart at 20, does not need 80     FEN: stop IVF, pt now eating more      PPx: Heparin subcu     Dispo:  At this time full code, pending course    [facility] 12.8* 9.6   HGB 13.7 12.3 12.8   MCV 87.4 89.1 87.4   .0 217.0 220.0       Recent Labs   Lab 06/29/21  1838 06/30/21  0630 07/01/21  0638    142 145   K 3.5 3.4* 3.1*    111 115*   CO2 26.0 27.0 22.0   BUN 11 10 11   CREATSERUM 0.78 0.74

## 2021-07-01 NOTE — PHYSICAL THERAPY NOTE
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

## 2021-07-02 NOTE — PROGRESS NOTES
Patient is a 80year old   female with history of CKD stage III, hyperlipidemia, senile osteoporosis, dementia and depression who presented to the hospital with failure to thrive and decrease in appetite and weakness.     Consult Duration Alcohol use:  Yes      Alcohol/week: 1.0 standard drinks      Types: 1 Glasses of wine per week      Comment: 1 drink a year    Drug use: Never          Current Medications:  atorvastatin (LIPITOR) tab 20 mg, 20 mg, Oral, Nightly  mirtazapine (REMERON) tab (CPT=71045)    Result Date: 6/30/2021  CONCLUSION:  1. No significant change from previous study redemonstrating prominent pulmonary markings throughout the interstitium which may be chronic and may suggest interstitial fibrosis.   Hyperinflation suggesting mood.  Patient and family agreeable to increase medication. Dr. Brielle Lowe acknowledged improvement and agreeable on treatment. Discussed risk and benefit, acknowledging the current symptom and severity.   At this point, I would recommend the following a

## 2021-07-02 NOTE — DISCHARGE SUMMARY
[facility] Internal Medicine Discharge Summary   Patient ID:  Bradford Wakefield  [id number]  89 year old  11/6/1933    Admit date: 6/29/2021    Discharge date and time: 7/2/21    Attending Physician: Rakesh Morgan MD     Primary Care Physician: Dasha Cleaning MG Tabs  Take 1 tablet (10 mg total) by mouth daily.         STOP taking these medications    CoQ10 100 MG Caps     escitalopram 10 MG Tabs  Commonly known as: LEXAPRO     magnesium 250 MG Tabs           Where to Get Your Medications      These medications prompting she gets pretty defensive and agitated. Family states they have been dealing with this attitude also during last several weeks.  states that she is sleeping maybe 3 to 4 hours a night. Does doze off intermittently throughout the day.   Mara Wyatt  and HH. DTR available for support.     Discharge Diagnoses:   Decreased oral intake of both food and water  Weakness  Failure to thrive  Vascular dementia   -yesterday very defensive but today much more pleasant  -eating more, working with PT  -pt up failure to thrive. TECHNIQUE:   Single view. FINDINGS:  CARDIAC/VASC: Heart size upper limits of normal, the pulmonary vascularity is normal.  Moderate tortuosity of the aortic knob. MEDIAST/MOOKIE:   No visible mass or adenopathy.  LUNGS/PLEURA: Promin CONCLUSION:   Findings suggestive of possible COPD with perihilar scarring/bronchiectasis, right greater than left, and hyperexpanded lungs. Negative for focal consolidation.     Dictated by (CST): Aaron Pitts MD on 6/15/2021 at 8:07 AM     Finalized by

## 2021-07-02 NOTE — PLAN OF CARE
Problem: Patient Centered Care  Goal: Patient preferences are identified and integrated in the patient's plan of care  Description: Interventions:  - What would you like us to know as we care for you?  Lives at [address] [place] [address] with Progressing  Note: Denies pain.        Problem: RISK FOR INFECTION - ADULT  Goal: Absence of fever/infection during anticipated neutropenic period  Description: INTERVENTIONS  - Monitor WBC  - Administer growth factors as ordered  - Implement neutropenic gu

## 2021-07-02 NOTE — OCCUPATIONAL THERAPY NOTE
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

## 2021-07-02 NOTE — PLAN OF CARE
Problem: Patient Centered Care  Goal: Patient preferences are identified and integrated in the patient's plan of care  Description: Interventions:  - What would you like us to know as we care for you?  Lives at [address] [place] [address] with services based on physician/LIP order or complex needs related to functional status, cognitive ability or social support system  Outcome: Progressing

## 2021-07-07 PROBLEM — N18.30 CKD (CHRONIC KIDNEY DISEASE) STAGE 3, GFR 30-59 ML/MIN (HCC): Status: RESOLVED | Noted: 2019-04-16 | Resolved: 2021-01-01

## 2021-07-07 PROBLEM — E46 PROTEIN-CALORIE MALNUTRITION, UNSPECIFIED SEVERITY (HCC): Status: ACTIVE | Noted: 2021-01-01

## 2021-07-07 NOTE — PAYOR COMM NOTE
Discharge Notification    Patient Name: Laura Acosta: [address] [facility] #: [id number]  Authorization Number: [id number]  Admit Date/Time: 6/29/2021 7:04 PM  Discharge Date/Time: 7/2/2021 2:06 PM

## 2021-07-22 PROBLEM — I62.03 CHRONIC SUBDURAL HEMATOMA (HCC): Status: ACTIVE | Noted: 2021-01-01
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
## 2021-07-22 PROBLEM — S00.81XA ABRASION OF FACE, INITIAL ENCOUNTER: Status: ACTIVE | Noted: 2021-01-01

## 2021-07-22 PROBLEM — S06.341A TRAUMATIC HEMORRHAGE OF RIGHT CEREBRUM WITH LOSS OF CONSCIOUSNESS OF 30 MINUTES OR LESS (HCC): Status: ACTIVE | Noted: 2021-01-01

## 2021-07-22 PROBLEM — S06.341A: Status: ACTIVE | Noted: 2021-01-01

## 2021-07-22 NOTE — H&P
[facility] Hospitalist H&P       CC: Fall    PCP: Alie Alves MD    History of Present Illness:   Mrs. Theodora Peña is a 80year old female with history of Alzheimer's dementia, depression, hx of hypertension now off meds, who presents to the hospital for eval extremities    Diagnostic Data:    CBC/Chem  Recent Labs   Lab 07/22/21  1207 07/22/21  1256   WBC 12.4*  --    HGB 13.9  --    MCV 89.4  --    .0  --    INR  --  1.05       Recent Labs   Lab 07/22/21  1207      K 3.8      CO2 26.0   BUN fall.    Fall  Intraparenchymal hemorrhage, right frontal lobe and posterior right occipital lobe  Possible left subdural hematoma  -secondary to fall  -her vitals and BP are stable.  SBP is currently less than 140  -she does not have focal neurologic defic

## 2021-07-22 NOTE — ED INITIAL ASSESSMENT (HPI)
Pt reports tripping and falling at her independent living facility. EMS reporting an approximate 2 minute LOC. Pt states she believes she fell landing on her back side but does not recall hitting her head. Pt presents with small lac to left eyebrow.  C rommel

## 2021-07-22 NOTE — ED PROVIDER NOTES
Patient Seen in: [facility] AND [facility] Emergency Department      History   Patient presents with:  Fall    Stated Complaint: fall     HPI/Subjective:   HPI     History is provided by EMS and patient.     22-year-old female with history of Alzheimer's and rukhsana Vitals [07/22/21 1159]   BP (!) 162/91   Pulse 100   Resp 18   Temp 98 °F (36.7 °C)   Temp src    SpO2 95 %   O2 Device None (Room air)       Current:/89   Pulse 84   Temp 98 °F (36.7 °C)   Resp 25   SpO2 94%         Physical Exam  Vitals and nursing Calculated Osmolality 297 (H) 275 - 295 mOsm/kg    GFR, Non- 54 (L) >=60    GFR, -American 62 >=60   Troponin I    Collection Time: 07/22/21 12:07 PM   Result Value Ref Range    Troponin <0.045 <0.045 ng/mL   CBC W/ DIFFERENTIAL    C subdural fluid collection on the left measuring up to 2.1 centimeters in thickness, causing mild sulcal effacement but no significant midline shift. Findings are suspicious for a subdural hematoma, potentially chronic given the CSF density.   3. Moderate g their blood pressure re-checked within 1 week was provided. Medical Record Review: I personally reviewed available prior medical records for any recent pertinent discharge summaries, testing, and procedures, and reviewed those reports.     Complicating

## 2021-07-22 NOTE — CONSULTS
Critical Care Consult     Assessment / Plan:  1. Traumatic ICH  - goal SBP <140  - frequent neurochecks  - CT brain in morning  - PT/OT/speech eval  - neurosurgery to see  2. Alzheimer's dementia  - supportive care  3. FEN  - speech eval  4.  PPx  - SCDs  5 gallops  Lungs: clear bilaterally, normal respiratory effort, no accessory muscle use  Abdomen: soft, nontender, nondistended  Extremities: no edema or cyanosis  Neuro: CN II-XII intact.  Strength 5/5 throughout  Psych: interactive, answering questions appr

## 2021-07-23 NOTE — PLAN OF CARE
Problem: Patient Centered Care  Goal: Patient preferences are identified and integrated in the patient's plan of care  Description: Interventions:  - What would you like us to know as we care for you?  Lives in [address] [place] [address] live with , daughter radha heart rate control medications as ordered  - Initiate emergency measures for life threatening arrhythmias  - Monitor electrolytes and administer replacement therapy as ordered  Outcome: Progressing     Problem: GENITOURINARY - ADULT  Goal: Absence of urina output, blood pressure (other measures as available)  - Encourage oral intake as appropriate  - Instruct patient on fluid and nutrition restrictions as appropriate  Outcome: Progressing     Problem: METABOLIC/FLUID AND ELECTROLYTES - ADULT  Goal: Glucose m integrity  - Assess and document dressing/incision, wound bed, drain sites and surrounding tissue  - Implement wound care per orders  - Initiate isolation precautions as appropriate  - Initiate Pressure Ulcer prevention bundle as indicated  Outcome: Pamela related to functional status, cognitive ability or social support system  Outcome: Progressing     Problem: SAFETY ADULT - FALL  Goal: Free from fall injury  Description: INTERVENTIONS:  - Assess pt frequently for physical needs  - Identify cognitive and p

## 2021-07-23 NOTE — PROGRESS NOTES
[facility] Hospitalist Progress Note     CC: Hospital Follow up    PCP: Andrew Butterfield MD       Assessment/Plan:   Mrs. Lyndsay Ashley is a 80year old female with history of Alzheimer's dementia, depression, hx of hypertension now off meds, who presents to the hospi distress  Lungs: clear to ausculation bilaterally  Heart: Regular rate and rhythm  Abdomen: soft, non tender  Extremities: No edema  Neuro: CN II-XII intact, 5/5 strength in bilateral extremities, normal sensation in face and extremities      Data Review: were discussed with Dr. Stevie Anguiano at 12:42 p.m. on 07/22/2021    Dictated by (CST): Melissa Barnes MD on 7/22/2021 at 12:36 PM     Finalized by (CST): Melissa Barnes MD on 7/22/2021 at 12:46 PM          CT SPINE CERVICAL (CPT=72125)    Result Date: 7/22/2

## 2021-07-23 NOTE — PLAN OF CARE
Problem: Patient Centered Care  Goal: Patient preferences are identified and integrated in the patient's plan of care  Description: Interventions:  - What would you like us to know as we care for you?   - Provide timely, complete, and accurate informatio administer replacement therapy as ordered  Outcome: Progressing     Problem: METABOLIC/FLUID AND ELECTROLYTES - ADULT  Goal: Glucose maintained within prescribed range  Description: INTERVENTIONS:  - Monitor Blood Glucose as ordered  - Assess for signs and Incision(s), wounds(s) or drain site(s) healing without S/S of infection  Description: INTERVENTIONS:  - Assess and document risk factors for pressure ulcer development  - Assess and document skin integrity  - Assess and document dressing/incision, wound b ordered activity level and limitations with patient/family  Outcome: Progressing  Goal: Maintain proper alignment of affected body part  Description: INTERVENTIONS:  - Support and protect limb and body alignment per provider's orders  - Instruct and reinfo anxiety  - Utilize distraction and/or relaxation techniques  - Monitor for opioid side effects  - Notify MD/LIP if interventions unsuccessful or patient reports new pain  - Anticipate increased pain with activity and pre-medicate as appropriate  Outcome: P Asc Procedure Text (F): After obtaining clear surgical margins the patient was sent to an ASC for surgical repair.  The patient understands they will receive post-surgical care and follow-up from the ASC physician.

## 2021-07-23 NOTE — CM/SW NOTE
07/23/21 1200   CM/SW Referral Data   Referral Source Physician   Reason for Referral Discharge planning   Informant Spouse; Children   Patient Info   Advanced directives?  Yes   Patient's Mental Status Alert;Memory Impairments   Patient's Home Environmen

## 2021-07-23 NOTE — OCCUPATIONAL THERAPY NOTE
OCCUPATIONAL THERAPY EVALUATION - INPATIENT     Room Number: 228/228-A  Evaluation Date: 7/23/2021  Type of Evaluation: Initial  Presenting Problem:  ([address])    Physician Order: IP Consult to Occupational Therapy  Reason for Therapy: ADL/IADL Dysfunction and Recommendations: Sub-acute rehabilitation  OT Device Recommendations: TBD    PLAN  OT Treatment Plan: Balance activities; Energy conservation/work simplification techniques;ADL training;Functional transfer training; Endurance training;Patient/Family educatio recall of biographical information, decreased recall of recent events, decreased long term memory and decreased short term memory  Following Commands:  follows one step commands consistently  Safety Judgement:  decreased awareness of need for assistance an Bathing: mod a   Toileting: mod a   Upper Extremity Dressing: min a   Lower Extremity Dressing: mod a     Education Provided: Educated pt on role of OT in acute care setting, DC planning discussed with MD and family, physiological benefits of functional

## 2021-07-23 NOTE — VIDEO SWALLOW STUDY NOTE
ADULT SWALLOWING EVALUATION    ASSESSMENT    ASSESSMENT/OVERALL IMPRESSION:      Proper PPE worn. Hands sanitized upon entrance/exit Pt room.        This BSE was ordered d/t Pt admitted with traumatic hemorrhage of (R) cerebrum with 30 minute loss of consci during this assessment. IMPRESSIONS:    Pt presents with functional oral swallow and possible pharyngeal dysfunction. Per [facility] MATTIE Scale, Pt's swallow function is Level 6 of 7. Collaborated with RN regarding Pt's swallowing plan of care.  BSE results/eladia Aspiration    Patient/Family Goals: least restrictive safest diet      SWALLOWING HISTORY  Current Diet Consistency: Regular; Thin liquids    OBJECTIVE   ORAL MOTOR EXAMINATION  Dentition: Functional  Symmetry: Within Functional Limits  Strength:  Within [employer]

## 2021-07-23 NOTE — PHYSICAL THERAPY NOTE
PHYSICAL THERAPY EVALUATION - INPATIENT     Room Number: 228/228-A  Evaluation Date: 7/23/2021  Type of Evaluation: Initial   Physician Order: PT Eval and Treat    Presenting Problem: L SAH  Reason for Therapy: Mobility Dysfunction and Discharge Planning will benefit from continued IP PT services to address these deficits in preparation for discharge.     DISCHARGE RECOMMENDATIONS  PT Discharge Recommendations: Sub-acute rehabilitation (memory care once physically independent) or mod ind    PLAN  PT Treatme Turning over in bed (including adjusting bedclothes, sheets and blankets)?: A Little   -   Sitting down on and standing up from a chair with arms (e.g., wheelchair, bedside commode, etc.): A Little   -   Moving from lying on back to sitting on the side of

## 2021-07-23 NOTE — PAYOR COMM NOTE
--------------  ADMISSION REVIEW     Payor: [address] [place], [place] [address] #:  [id number]  Authorization Number: [id number]               ED Provider Notes        History   Patient presents with:  Fall    Stated Complaint: fall     HPI/Subjective: SpO2 94%         Physical Exam  Vitals and nursing note reviewed. HENT:      Head: Normocephalic.       Comments: L side of forehead abrasion - dried blood over face     Mouth/Throat:      Mouth: Mucous membranes are moist.   Eyes:      Extraocular Movem <0.045 ng/mL   CBC W/ DIFFERENTIAL    Collection Time: 07/22/21 12:07 PM   Result Value Ref Range    WBC 12.4 (H) 4.0 - 11.0 x10(3) uL    RBC 4.79 3.80 - 5.30 x10(6)uL    HGB 13.9 12.0 - 16.0 g/dL    HCT 42.8 35.0 - 48.0 %    MCV 89.4 80.0 - 100.0 fL    MC chronic given the CSF density. 3. Moderate generalized cerebral atrophy and chronic ischemic white matter changes.    Above acute findings were discussed with Dr. Sal Melgar at 12:42 p.m. on 07/22/2021    Dictated by (CST): Sapna Sutherland MD on 7/22/2021 diagnoses were considered based on the presenting problem including ICH, fracture, abrasion, fall, arrhythmia.          Disposition and Plan     Clinical Impression:  Traumatic hemorrhage of right cerebrum with loss of consciousness of 30 minutes or less, i TROP <0.045            ASSESSMENT / PLAN:   Mrs. Lyndsay Ashley is a 80year old female with history of Alzheimer's dementia, depression, hx of hypertension now off meds, who presents to the hospital for evaluation of fall.     Fall  Intraparenchymal hemorrhage, r subdural hematoma  -secondary to fall  -her vitals and BP are stable. SBP is currently less than 140  -she does not have focal neurologic deficits  -neurosurgery was consulted and have reviewed.  CT head today stable  -routine ICH order set in place, now st

## 2021-07-23 NOTE — PROGRESS NOTES
Critical Care Progress Note     Assessment / Plan:  1. Traumatic ICH - repeat CT brain grossly stable  - goal SBP <140  - PT/OT/speech eval  - neurosurgery signed off  2. Alzheimer's dementia  - supportive care  3. FEN  - speech eval  4. PPx  - SCDs  5.  Jose Cipro

## 2021-07-23 NOTE — CONSULTS
[facility] - [facility]    Neurosurgery Consultation    Antonina Anaya Patient Status:  Inpatient    1933 MRN [id number]   Location [facility] 2W/SW Attending Luana Londono, [address] [facility] Day # 1 PCP Tevin Waldron MD     Date of A (ZOFRAN) injection 4 mg, 4 mg, Intravenous, Q6H PRN **OR** Metoclopramide HCl (REGLAN) injection 5 mg, 5 mg, Intravenous, Q8H PRN  •  LORazepam (ATIVAN) injection 1 mg, 1 mg, Intravenous, Q15 Min PRN    Review of Systems:  A 10-point system was reviewed. CT SPINE CERVICAL (CPT=72125)    Result Date: 7/22/2021  CONCLUSION:   1. No evidence for acute fracture or traumatic subluxation of the cervical spine. 2. Diffuse bone demineralization.   3. Mild-to-moderate multilevel degenerative disc and joint dise

## 2021-07-23 NOTE — PROGRESS NOTES
Pt admitted from ED this evening. Alert to person and place. Able to move all extremities. Follows commands. Needs frequent reminders. Neuro assessments Q1. Pt reports wearing glasses but that she forgot her glasses at home.  Skin checked completed with Carol

## 2021-07-24 NOTE — PLAN OF CARE
Alert and oriented x 1-2, pleasantly confused. Neurological checks every shift, unchanged, remain stable. General diet, poor appetite. Tylenol for headache.  Walking stand-by assist. Impulsive, frequent rounding, [place] monitoring, call light in reach, evan

## 2021-07-24 NOTE — CM/SW NOTE
LATASHA notified by RN, Cleared for DC. LATASHA called and spoke with daughter, Lea Zhang. Requested list be emailed [email or web address] and be given paper copy. LATASHA emailed list and will follow up with paper copy.      1253pm  Met with patient and daughter, Ricarda Aguilar

## 2021-07-24 NOTE — PLAN OF CARE
Patient received transferred from CCU, daughter and  at bedside, vital signs stable on room air, alert and oriented to person and place, tolerating diet (minimal appetite), ambulating with standby assist (impulsive).  Fall precautions in place, patie and administer replacement therapy as ordered  Outcome: Progressing     Problem: GENITOURINARY - ADULT  Goal: Absence of urinary retention  Description: INTERVENTIONS:  - Assess patient’s ability to void and empty bladder  - Monitor intake/output and perfo nutrition restrictions as appropriate  Outcome: Progressing     Problem: SKIN/TISSUE INTEGRITY - ADULT  Goal: Skin integrity remains intact  Description: INTERVENTIONS  - Assess and document risk factors for pressure ulcer development  - Assess and documen mobility to safest level of function  Description: INTERVENTIONS:  - Assess patient stability and activity tolerance for standing, transferring and ambulating w/ or w/o assistive devices  - Assist with transfers and ambulation using safe patient handling e scale  - Administer analgesics based on type and severity of pain and evaluate response  - Implement non-pharmacological measures as appropriate and evaluate response  - Consider cultural and social influences on pain and pain management  - Manage/alleviat needs related to functional status, cognitive ability or social support system  Outcome: Progressing

## 2021-07-24 NOTE — PROGRESS NOTES
[facility] Hospitalist Progress Note     CC: Hospital Follow up    PCP: Sulaiman Sheets MD       Assessment/Plan:   Mrs. Huy Jim is a 80year old female with history of Alzheimer's dementia, depression, hx of hypertension now off meds, who presents to the hospi bilaterally  Heart: Regular rate and rhythm  Abdomen: soft, non tender  Extremities: No edema  Neuro: CN II-XII intact, 5/5 strength in bilateral extremities, normal sensation in face and extremities      Data Review:       Labs:     Recent Labs   Lab 07/2 12:42 p.m. on 07/22/2021    Dictated by (CST): Nick Costa MD on 7/22/2021 at 12:36 PM     Finalized by (CST): Nick Costa MD on 7/22/2021 at 12:46 PM          CT SPINE CERVICAL (CPT=72125)    Result Date: 7/22/2021  CONCLUSION:   1.  No evidence for ac

## 2021-07-24 NOTE — SLP NOTE
SPEECH DAILY NOTE - INPATIENT    ASSESSMENT & PLAN   ASSESSMENT  PPE REQUIRED. THIS SLP WORE GLOVES AND DROPLET MASK. HANDS SANITIZED/WASHED UPON ENTRANCE/EXIT.     Per RN, pt is tolerating current diet without overt clinical signs or symptoms of aspiration Slow rate, Small bites, Small sips, Alternate liquids/solids, No straws, Upright 90 degrees with minimal  assistance 90 % of the time across 2 sessions.     Straws observed in Pt's room upon SLP arrival. Compensatory strategies (no straw, small bites, smal

## 2021-07-25 NOTE — PLAN OF CARE
Neuro checks Qshift, remain stable and unchanged. Walking stand-by assist. Denies pain. Vitals stable. Family at bedside and updated on plan of care. Plan to discharge to [facility] rehab when insurance authorizes.  Bed in lowest position, call light in re

## 2021-07-25 NOTE — PROGRESS NOTES
[facility] Hospitalist Progress Note     CC: Hospital Follow up    PCP: Yamil Samuels MD       Assessment/Plan:   Mrs. Caity Hardy is a 80year old female with history of Alzheimer's dementia, depression, hx of hypertension now off meds, who presents to the hospi General: Alert, no acute distress  Lungs: clear to ausculation bilaterally  Heart: Regular rate and rhythm  Abdomen: soft, non tender  Extremities: No edema  Neuro: CN II-XII intact, 5/5 strength in bilateral extremities, normal sensation in face and ext

## 2021-07-25 NOTE — PLAN OF CARE
Pt A/Ox1-2. Daughter at bedside. Given tylenol for headache. Denies nausea. Tolerating general diet. Saline locked. Voiding freely. Ambulating with standby assist. Urban spears 6. Call light within reach. Bed alarm on. Safety precautions in place.       Pro monitoring, monitor vital signs, obtain 12 lead EKG if indicated  - Evaluate effectiveness of antiarrhythmic and heart rate control medications as ordered  - Initiate emergency measures for life threatening arrhythmias  - Monitor electrolytes and administe as indicated or ordered  - Monitor response to interventions for patient's volume status, including labs, urine output, blood pressure (other measures as available)  - Encourage oral intake as appropriate  - Instruct patient on fluid and nutrition restrict to staff  - Avoid use of toothpicks and dental floss  - Use electric shaver for shaving  - Use soft bristle tooth brush  - Limit straining and forceful nose blowing  Outcome: Progressing     Problem: MUSCULOSKELETAL - ADULT  Goal: Return mobility to safest Progressing     Problem: PAIN - ADULT  Goal: Verbalizes/displays adequate comfort level or patient's stated pain goal  Description: INTERVENTIONS:  - Encourage pt to monitor pain and request assistance  - Assess pain using appropriate pain scale  - Adminis appropriate  - Assess patient's ability to be responsible for managing their own health  - Refer to Case Management Department for coordinating discharge planning if the patient needs post-hospital services based on physician/LIP order or complex needs rel

## 2021-07-25 NOTE — CM/SW NOTE
LATASHA followed up on DC planning. LATASHA called and spoke with Azra shultz over the phone for placement. Pal Hernandes states after review, they would like pt to DC to [facility] once medically cleared.      LATASHA reserved [facility] in [place] and notifie

## 2021-07-26 NOTE — DISCHARGE SUMMARY
General Medicine Discharge Summary     Patient ID:  Delmis Anaya  80year old  11/6/1933    Admit date: 7/22/2021    Discharge date and time: 7/26/2021    Attending Physician: Shama Aden DO Treatment    Exam on day of discharge:      07/26/21  1100   BP: 116/74   Pulse: 78   Resp: 16   Temp: 98 °F (36.7 °C)   General: Alert, no acute distress  Lungs: clear to ausculation bilaterally  Heart: Regular rate and rhythm  Abdomen: soft, non tender

## 2021-07-26 NOTE — CM/SW NOTE
Received phone call from patient daughter, Brice Do. Azra confirmed choice is [facility], however requested LATASHA to look into [facility]. LATASHA sent referral to [facility], no beds avail at this time. LATASHA notified daughter, Brice Do.  Azra [facility]

## 2021-07-26 NOTE — PLAN OF CARE
Patient alert and oriented x1. General diet, tolerating well. Accuchecks performed Q6. Up with standby assist. Remote tele in place. PRN zofran given x1 for nausea with relief. Call light in reach. Frequent rounding performed.       Problem: Patient Mannie Runner arrhythmias or at baseline  Description: INTERVENTIONS:  - Continuous cardiac monitoring, monitor vital signs, obtain 12 lead EKG if indicated  - Evaluate effectiveness of antiarrhythmic and heart rate control medications as ordered  - Initiate emergency m output and patient weight  - Monitor urine specific gravity, serum osmolarity and serum sodium as indicated or ordered  - Monitor response to interventions for patient's volume status, including labs, urine output, blood pressure (other measures as availab activity and self care with guidance from PT/OT  - Encourage visually impaired, hearing impaired and aphasic patients to use assistive/communication devices  Outcome: Not Progressing     Problem: MUSCULOSKELETAL - ADULT  Goal: Return mobility to safest lev for physical needs  - Identify cognitive and physical deficits and behaviors that affect risk of falls.   - [place] fall precautions as indicated by assessment.  - Educate pt/family on patient safety including physical limitations  - Instruct pt to call f

## 2021-07-26 NOTE — PLAN OF CARE
No acute changes overnight. Patient is alert and oriented x1-2. Neuro status unchanged. No complaints of pain. Tolerating diet without nausea/vomiting. Up with standby assist. Voiding freely.  Plan to discharge to [facility] pending insurance appr decreased coronary artery perfusion - ex.  Angina  - Evaluate fluid balance, assess for edema, trend weights  Outcome: Progressing  Goal: Absence of cardiac arrhythmias or at baseline  Description: INTERVENTIONS:  - Continuous cardiac monitoring, monitor vi maintained  Description: INTERVENTIONS:  - Monitor labs and assess for signs and symptoms of volume excess or deficit  - Monitor intake, output and patient weight  - Monitor urine specific gravity, serum osmolarity and serum sodium as indicated or ordered Ensure safe mobilization of patient  - Hold pressure on venipuncture sites to achieve adequate hemostasis  - Assess for signs and symptoms of internal bleeding  - Monitor lab trends  - Patient is to report abnormal signs of bleeding to staff  - Avoid use o

## 2021-07-26 NOTE — PROGRESS NOTES
[facility] Hospitalist Progress Note     CC: Hospital Follow up    PCP: Alanis Ronquillo MD       Assessment/Plan:   Mrs. Cortez Sewell is a 80year old female with history of Alzheimer's dementia, depression, hx of hypertension now off meds, who presents to the hospi Regular rate and rhythm  Abdomen: soft, non tender  Extremities: No edema  Neuro: CN II-XII intact, 5/5 strength in bilateral extremities, normal sensation in face and extremities      Data Review:       Labs:     Recent Labs   Lab 07/22/21  1207   RBC 4.7

## 2021-07-26 NOTE — PHYSICAL THERAPY NOTE
PHYSICAL THERAPY TREATMENT NOTE - INPATIENT     Room Number: 455/455-A       Presenting Problem: L SAH    Problem List  Principal Problem:    Traumatic hemorrhage of right cerebrum with loss of consciousness of 30 minutes or less, [facility], however, family declining recommendation- would benefit from HHPT with 24 hour supervision/care for safety.      DISCHARGE RECOMMENDATIONS  PT Discharge Recommendations:  (family declining rec: plan for HHPT with 24 hour supervision)     PLAN EXERCISES  Lower Extremity Alternating marching  Ankle pumps  LAQ     Position Sitting       Patient End of Session: Up in chair;Needs met;Call light within reach;RN aware of session/findings; Family present; Alarm set    CURRENT GOALS   Goals to be met by:

## 2021-07-26 NOTE — SLP NOTE
SPEECH DAILY NOTE - INPATIENT    ASSESSMENT & PLAN   ASSESSMENT  PPE REQUIRED. THIS THERAPIST WORE GLOVES AND DROPLET MASK FOR DURATION OF TX SESSION. HANDS WASHED UPON ENTRANCE/EXIT.     SLP f/u for ongoing dysphagia tx/meal assessment per recommendations MD on 6/30/2021 at 6:48 AM         Diet Recommendations - Solids: Regular  Diet Recommendations - Liquids: Thin Liquids    Compensatory Strategies Recommended: No straws; Alternate consistencies  Aspiration Precautions: Upright position; Slow rate;Small bite

## 2021-07-27 NOTE — PAYOR COMM NOTE
Discharge Notification    Patient Name: Fredi Bland: [address] [place] [address] #: [id number]  Authorization Number: [id number]  Admit Date/Time: 7/22/2021 12:01 PM  Discharge Date/Time: 7/26/2021 4:46 PM

## 2021-08-06 PROBLEM — J47.9 BRONCHIECTASIS (HCC): Status: ACTIVE | Noted: 2021-01-01

## 2021-08-06 PROBLEM — J44.9 COPD (CHRONIC OBSTRUCTIVE PULMONARY DISEASE) (HCC): Status: ACTIVE | Noted: 2021-01-01
